# Patient Record
Sex: MALE | Race: WHITE | Employment: FULL TIME | URBAN - METROPOLITAN AREA
[De-identification: names, ages, dates, MRNs, and addresses within clinical notes are randomized per-mention and may not be internally consistent; named-entity substitution may affect disease eponyms.]

---

## 2019-11-18 ENCOUNTER — APPOINTMENT (OUTPATIENT)
Dept: CT IMAGING | Facility: CLINIC | Age: 29
End: 2019-11-18
Attending: EMERGENCY MEDICINE
Payer: MEDICAID

## 2019-11-18 ENCOUNTER — HOSPITAL ENCOUNTER (EMERGENCY)
Facility: CLINIC | Age: 29
Discharge: HOME | End: 2019-11-18
Attending: EMERGENCY MEDICINE | Admitting: EMERGENCY MEDICINE
Payer: MEDICAID

## 2019-11-18 VITALS
RESPIRATION RATE: 16 BRPM | HEART RATE: 74 BPM | OXYGEN SATURATION: 99 % | DIASTOLIC BLOOD PRESSURE: 94 MMHG | TEMPERATURE: 97.8 F | SYSTOLIC BLOOD PRESSURE: 137 MMHG | WEIGHT: 190 LBS

## 2019-11-18 DIAGNOSIS — N23 RENAL COLIC: ICD-10-CM

## 2019-11-18 DIAGNOSIS — N20.0 KIDNEY STONE: ICD-10-CM

## 2019-11-18 LAB
ALBUMIN SERPL-MCNC: 4 G/DL (ref 3.4–5)
ALBUMIN UR-MCNC: 20 MG/DL
ALP SERPL-CCNC: 80 U/L (ref 40–150)
ALT SERPL W P-5'-P-CCNC: 29 U/L (ref 0–70)
ANION GAP SERPL CALCULATED.3IONS-SCNC: 4 MMOL/L (ref 3–14)
APPEARANCE UR: CLEAR
AST SERPL W P-5'-P-CCNC: 20 U/L (ref 0–45)
BASOPHILS # BLD AUTO: 0 10E9/L (ref 0–0.2)
BASOPHILS NFR BLD AUTO: 0.4 %
BILIRUB SERPL-MCNC: 0.5 MG/DL (ref 0.2–1.3)
BILIRUB UR QL STRIP: NEGATIVE
BUN SERPL-MCNC: 18 MG/DL (ref 7–30)
CALCIUM SERPL-MCNC: 8.9 MG/DL (ref 8.5–10.1)
CAOX CRY #/AREA URNS HPF: ABNORMAL /HPF
CHLORIDE SERPL-SCNC: 106 MMOL/L (ref 94–109)
CO2 SERPL-SCNC: 28 MMOL/L (ref 20–32)
COLOR UR AUTO: YELLOW
CREAT SERPL-MCNC: 1.06 MG/DL (ref 0.66–1.25)
DIFFERENTIAL METHOD BLD: NORMAL
EOSINOPHIL # BLD AUTO: 0.1 10E9/L (ref 0–0.7)
EOSINOPHIL NFR BLD AUTO: 1.1 %
ERYTHROCYTE [DISTWIDTH] IN BLOOD BY AUTOMATED COUNT: 12.5 % (ref 10–15)
GFR SERPL CREATININE-BSD FRML MDRD: >90 ML/MIN/{1.73_M2}
GLUCOSE SERPL-MCNC: 94 MG/DL (ref 70–99)
GLUCOSE UR STRIP-MCNC: NEGATIVE MG/DL
HCT VFR BLD AUTO: 43.9 % (ref 40–53)
HGB BLD-MCNC: 14.5 G/DL (ref 13.3–17.7)
HGB UR QL STRIP: ABNORMAL
IMM GRANULOCYTES # BLD: 0 10E9/L (ref 0–0.4)
IMM GRANULOCYTES NFR BLD: 0.2 %
KETONES UR STRIP-MCNC: NEGATIVE MG/DL
LEUKOCYTE ESTERASE UR QL STRIP: NEGATIVE
LYMPHOCYTES # BLD AUTO: 2 10E9/L (ref 0.8–5.3)
LYMPHOCYTES NFR BLD AUTO: 20.8 %
MCH RBC QN AUTO: 28.8 PG (ref 26.5–33)
MCHC RBC AUTO-ENTMCNC: 33 G/DL (ref 31.5–36.5)
MCV RBC AUTO: 87 FL (ref 78–100)
MONOCYTES # BLD AUTO: 0.9 10E9/L (ref 0–1.3)
MONOCYTES NFR BLD AUTO: 9.7 %
MUCOUS THREADS #/AREA URNS LPF: PRESENT /LPF
NEUTROPHILS # BLD AUTO: 6.4 10E9/L (ref 1.6–8.3)
NEUTROPHILS NFR BLD AUTO: 67.8 %
NITRATE UR QL: NEGATIVE
NRBC # BLD AUTO: 0 10*3/UL
NRBC BLD AUTO-RTO: 0 /100
PH UR STRIP: 5 PH (ref 5–7)
PLATELET # BLD AUTO: 196 10E9/L (ref 150–450)
POTASSIUM SERPL-SCNC: 3.6 MMOL/L (ref 3.4–5.3)
PROT SERPL-MCNC: 7.4 G/DL (ref 6.8–8.8)
RBC # BLD AUTO: 5.04 10E12/L (ref 4.4–5.9)
RBC #/AREA URNS AUTO: 159 /HPF (ref 0–2)
SODIUM SERPL-SCNC: 138 MMOL/L (ref 133–144)
SOURCE: ABNORMAL
SP GR UR STRIP: 1.03 (ref 1–1.03)
UROBILINOGEN UR STRIP-MCNC: NORMAL MG/DL (ref 0–2)
WBC # BLD AUTO: 9.4 10E9/L (ref 4–11)
WBC #/AREA URNS AUTO: 2 /HPF (ref 0–5)

## 2019-11-18 PROCEDURE — 99284 EMERGENCY DEPT VISIT MOD MDM: CPT | Mod: 25

## 2019-11-18 PROCEDURE — 74176 CT ABD & PELVIS W/O CONTRAST: CPT

## 2019-11-18 PROCEDURE — 25800030 ZZH RX IP 258 OP 636: Performed by: EMERGENCY MEDICINE

## 2019-11-18 PROCEDURE — 81001 URINALYSIS AUTO W/SCOPE: CPT | Performed by: EMERGENCY MEDICINE

## 2019-11-18 PROCEDURE — 96360 HYDRATION IV INFUSION INIT: CPT

## 2019-11-18 PROCEDURE — 80053 COMPREHEN METABOLIC PANEL: CPT | Performed by: EMERGENCY MEDICINE

## 2019-11-18 PROCEDURE — 85025 COMPLETE CBC W/AUTO DIFF WBC: CPT | Performed by: EMERGENCY MEDICINE

## 2019-11-18 RX ORDER — KETOROLAC TROMETHAMINE 15 MG/ML
15 INJECTION, SOLUTION INTRAMUSCULAR; INTRAVENOUS
Status: DISCONTINUED | OUTPATIENT
Start: 2019-11-18 | End: 2019-11-18 | Stop reason: HOSPADM

## 2019-11-18 RX ORDER — ONDANSETRON 2 MG/ML
4 INJECTION INTRAMUSCULAR; INTRAVENOUS EVERY 30 MIN PRN
Status: DISCONTINUED | OUTPATIENT
Start: 2019-11-18 | End: 2019-11-18 | Stop reason: HOSPADM

## 2019-11-18 RX ORDER — ONDANSETRON 4 MG/1
4 TABLET, ORALLY DISINTEGRATING ORAL EVERY 8 HOURS PRN
Qty: 15 TABLET | Refills: 0 | Status: SHIPPED | OUTPATIENT
Start: 2019-11-18

## 2019-11-18 RX ORDER — IBUPROFEN 600 MG/1
600 TABLET, FILM COATED ORAL EVERY 8 HOURS PRN
Qty: 30 TABLET | Refills: 0 | Status: SHIPPED | OUTPATIENT
Start: 2019-11-18 | End: 2019-12-18

## 2019-11-18 RX ADMIN — SODIUM CHLORIDE 1000 ML: 9 INJECTION, SOLUTION INTRAVENOUS at 08:48

## 2019-11-18 ASSESSMENT — ENCOUNTER SYMPTOMS
FEVER: 0
FREQUENCY: 0
NAUSEA: 1
FLANK PAIN: 1
DIARRHEA: 0
VOMITING: 1
DYSURIA: 0
CONSTIPATION: 0
HEMATURIA: 0
DIFFICULTY URINATING: 0

## 2019-11-18 NOTE — ED AVS SNAPSHOT
[facility] Emergency Department  [address]  [facility] [address]  Phone:  [phone]  Fax:  [phone]                                    Anita Claudio   MRN: [id number]    Department:  [facility] Emergency Department   Date of Visit:  11/18/2019           After Visit Summary Signature Page    I have received my discharge instructions, and my questions have been answered. I have discussed any challenges I see with this plan with the nurse or doctor.    ..........................................................................................................................................  Patient/Patient Representative Signature      ..........................................................................................................................................  Patient Representative Print Name and Relationship to Patient    ..................................................               ................................................  Date                                   Time    ..........................................................................................................................................  Reviewed by Signature/Title    ...................................................              ..............................................  Date                                               Time          22EPIC Rev 08/18

## 2019-11-18 NOTE — ED TRIAGE NOTES
Pt woke up with L groin/testical pain. One episode of vomiting. Currently pain free. No dysuria. ABC intact. A&O x4.

## 2024-01-15 NOTE — ED PROVIDER NOTES
History     Chief Complaint:  Groin Pain    HPI   Anita Claudio is a 29 year old male who presents with left hip pain radiating to his groin. He has had a prior appendectomy. The patient reports at 0600 this morning, he woke up to left sided hip pain which radiated to his groin which was worse with movement. He states he has been nauseated and vomited once. He notes that on his way to the ED, his pain resolved. He denies fevers, urinary symptoms, bowel symptoms, right-sided pain, and swelling or redness in the groin or testicles. He denies urinary symptoms. No trauma or injuries. He has no prior similar episodes of pain. No numbness, tingling, or weakness in his legs. He reports he otherwise feels well.     Allergies:  No Known Drug Allergies     Medications:    The patient is currently on no regular medications.     Past Medical History:    History reviewed. No pertinent past medical history.     Past Surgical History:    Appendectomy      Family History:    Kidney stone (Mother)    Social History:  The patient was accompanied to the ED by mother.  Smoking Status: never  Smokeless Tobacco: Never  Alcohol Use: Yes   Marital Status:  Single      Review of Systems   Constitutional: Negative for fever.   Gastrointestinal: Positive for nausea and vomiting. Negative for constipation and diarrhea.   Genitourinary: Positive for flank pain and testicular pain. Negative for decreased urine volume, difficulty urinating, dysuria, frequency, hematuria, penile pain, penile swelling, scrotal swelling and urgency.   All other systems reviewed and are negative.      Physical Exam     Patient Vitals for the past 24 hrs:   BP Temp Temp src Heart Rate Resp SpO2 Weight   11/18/19 0718 (!) 135/93 97.8  F (36.6  C) Temporal 78 16 98 % 86.2 kg (190 lb)     Physical Exam  General: Well appearing, nontoxic.  Resting comfortably  Head:  Scalp, face, and head appear normal  Eyes:  Pupils are equal, round    Conjunctivae non-injected and    Problem: At Risk for Falls  Goal: Patient takes action to control fall-related risks  Outcome: Monitoring/Evaluating progress     Problem: Impaired Physical Mobility  Goal: Tolerates activity for discharge setting with no abnormal symptoms  Outcome: Monitoring/Evaluating progress      sclerae white  ENT:    The external nose is normal    Pinnae are normal    The oropharynx is normal, mucous membranes moist    Posterior pharynx moderately erythematous.  No exudates or swelling.    Uvula is in the midline  Neck:  Trachea is in the midline  CV:  Regular rate and rhythm     Normal S1/S2, no S3/S4    No murmur or rub  Resp:  Lungs are clear and equal bilaterally    There is no tachypnea    No increased work of breathing    No rales, wheezing, or rhonchi  GI:  Abdomen is soft, no rigidity or guarding    No distension, or mass    No tenderness or rebound tenderness. No CVA tenderness.  No left lower quadrant tenderness.  MS:  Normal muscular tone  Skin:  No rash or acute skin lesions noted  Neuro: Awake and alert    Speech is normal and fluent    Moves all extremities spontaneously  Psych:  Normal affect.  Appropriate interactions.    Emergency Department Course   Imaging:  Radiographic findings were communicated with the patient who voiced understanding of the findings.    CT Abdomen Pelvis w Contrast  Findings suggest a recently passed 4 mm stone in the  dependent bladder as above.  As read by Radiology.     Laboratory:  CBC: WNL (WBC 9.4, HGB 14.5, )  CMP: WNL (Creatinine 1.06)  UA: Large blood, Protein albumin 20, RBC/ (H), Mucous present, Few Calcium oxalate o/w WNL    Procedures:  None    Interventions:  0848: NS 1L IV Bolus      Emergency Department Course:  Past medical records, nursing notes, and vitals reviewed.  0814: I performed an exam of the patient and obtained history, as documented above.  IV inserted and blood drawn.  The patient was sent for an abdomen pelvis CT while in the emergency department, findings above.  The patient provided a urine sample here in the emergency department. This was sent for laboratory testing, findings above.      0922: I rechecked the patient.     1021: I rechecked the patient.  Findings and plan explained to the Patient. Patient discharged  home with instructions regarding supportive care, medications, and reasons to return. The importance of close follow-up was reviewed.      Impression & Plan    Medical Decision Making:  Anita Claudio is a 29 year old male who presents with flank pain. A broad differential diagnosis was considered including diverticulitis, ureterolithiasis, tumor, colitis, cholecystitis, aneurysm, dissection, volvulus, appendicitis, splenic issue, stomach pathology, ulcer, hydronephrosis, pneumonia, rib fracture, UTI, pyelonephritis amongst many other etiologies.   Signs and symptoms consistent with ureterolithiasis.  Patients pain is controlled in ED. CT scan with 4mm stone in the dependent bladder. No other stones in the proximal urinary tract. No obstruction.  No signs of infected stone. Creatinine normal.  Given that stone has already passed into the bladder would not start flomax at this time. Patient is hemodynamically stable in ED. Plan is home with abdominal pain recheck by primary care physician or return to ED if symptoms worsen.  Return for fevers greater than 102, increasing pain, other new symptoms develop.  Ureterolithiasis precautions for home.  Questions were answered. Return precautions were discussed with patient. The patient's questions were answered and the patient was agreeable with discharge.     Diagnosis:    ICD-10-CM    1. Renal colic N23    2. Kidney stone N20.0        Disposition:  discharged to home    Discharge Medications:  New Prescriptions    IBUPROFEN (ADVIL/MOTRIN) 600 MG TABLET    Take 1 tablet (600 mg) by mouth every 8 hours as needed for pain    ONDANSETRON (ZOFRAN ODT) 4 MG ODT TAB    Take 1 tablet (4 mg) by mouth every 8 hours as needed for nausea or vomiting     11/18/2019   [facility] EMERGENCY DEPARTMENT  Brett PURCELL am serving as a scribe at 8:14 AM on 11/18/2019 to document services personally performed by Zachariah Mahoney MD based on my observations and the provider's  statements to me.      Zachariah Mahoney MD  11/18/19 8373

## 2025-03-17 ENCOUNTER — DOCUMENTATION ONLY (OUTPATIENT)
Dept: TRANSPLANT | Facility: CLINIC | Age: 35
End: 2025-03-17
Payer: MEDICAID

## 2025-03-17 ENCOUNTER — TELEPHONE (OUTPATIENT)
Dept: TRANSPLANT | Facility: CLINIC | Age: 35
End: 2025-03-17
Payer: MEDICAID

## 2025-03-17 NOTE — TELEPHONE ENCOUNTER
Donor Intake Start:3/11/2025Donor Intake Complete:3/13/2025  Expiration Date:2025  Gender:MalePreferred Language:English  Full Name:Anita Barrett  Needed:[not answered]  Preferred Name:Anita  Phone Number:[phone] Phone:[not answered]  Contact Preference:[not answered]Best Contact Time:10am - 3pm  Emergency Contact:Amirah Franco Contact #:[phone]  Relationship to Contact:Contact is my parent  :1990Age:34  Country:[place]  Address:[address] [place] [address] [address]City:[place]  State:[id number]  Height:6' 0''Weight:197lbs  BMI:26.7  Employment Status:EmployedHas PTO for donation?Yes, I will use my vacation days.  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 DegreeMarital Status:Single  Exercise Routine:NeverHealth Insurance:Yes  Blood Type:UnknownEthnicity/Race:White  Donor Type:Standard Voucher Donor  Prefer Remote Donation:[not answered]  Physician:[not answered]  Motivation to donate:For my friend angel known almost 20 years  Living Donor Pre-Screening  Is In [place]Yes  Will Accept Blood Transfusions?Yes  Has been Diagnosed with Kidney Disease?No  Has had a Heart Attack?No  Has Diabetes?No  Has had Cancer?No  Has had Kidney Stones?Yes    - number of episodes?0    - last stones passed?0    - last stones treated?0  Has Used TobaccoNo  Has HIV?No  Is Currently Incarcerated?No  Is Currently Residing in [place]Yes  History Misc  Has Allergies?Yes  Allergy  Cats and anise(neither diagnosed)  Has had Surgeries?Yes  Surgery When  appendectomy 2010  Takes Medication?No  Medical History  History of High BP?Never  Has History Of CABG (bypass surgery)?No  History of Blood Clots?Never  History of Coronary Disease?Never  Has Stents Implanted?No  Has History of Chest Pain with Exercise?No  Has History of Chest Pain at Other Times?No  Results of Climbing 2 Flights of Stairs?No Problem  Has had Stress Test within Last Year?No  Has had  Stroke?No  Has had Leg Bypass?No  History of Lung Disease?Never  History of COPD?Never  History of TB?Never    - Is TB Active?[not answered]  History of Pneumonia?Unknown  Has Respiratory Issues?No  Has Gastro Issues?No  History of Gallstones?Never  History of Pancreatitis?Never  History of Liver Disease?Never  History of Hepatitis B?Never    - Is Hep B Active?[not answered]  History of Hepatitis C?Never  History of Bleeding Problem?Never  History of UTIs?No  History of Kidney Damage?Never  History of Proteinuria?Never  History of Hematuria?Never  History of Neuro Disease?Never  History of Seizure?Never  History of Lupus?Never  History of Paralysis?Never  History of Arthritis?Never  History of Neuropathy?Never  History of Depression?Treated in past  History of Anxiety?Never  History of Documented Psychiatric Illness?Never  Has had Transfusions?Unknown  History of Obesity?No  History of Fabry's Disease?No  History of Sickle Cell Disease?No  History of Sickle Cell Trait?No  History of Sarcoidosis?No  History of Auto-Immune DiseaseNo  Has had Physical Exam?Yes    - how many years ago:4  Has had PSA Test?No  Has had Colonoscopy?No  Living Donor Family Medical History  Anyone with kidney disease?No  Anyone with liver disease?No  Anyone with heart disease?No  Anyone with coronary artery disease?No  Anyone with high blood pressure?Yes    - which family members:Mother, and 2nd uncle  Anyone with blood disorder?No  Anyone with cancer?No  Anyone with kidney cancer?No  Anyone with diabetes?Yes    - which family members:Great great aunt mothers side (type 2)  Is mother alive?Yes  Mother's age at time of death?60  Is father alive?Yes  Father's age at time of death?60  How many siblings?4  How many adult children?0  How many children under 18?0  Social History  Has Used Alcohol?Yes    - currently uses alcohol:Yes    - how much:1/Monthly  Has Abused Alcohol?No  Has Used Drugs?No  Has had legal issues w/ law  enforcement?No  Traveled over 100 miles from home in the last year?Yes    - Traveled Where?[place]  Has had suicidal thoughts or attempts in the last five years?No

## 2025-03-19 ENCOUNTER — TELEPHONE (OUTPATIENT)
Dept: TRANSPLANT | Facility: CLINIC | Age: 35
End: 2025-03-19
Payer: MEDICAID

## 2025-03-19 NOTE — TELEPHONE ENCOUNTER
Initial Independent Living Donor Advocate contact made with potential donor today.  I introduced myself and my role during the donation process, including:   CYNTHIA ROLE   The federal government requires that all licensed transplant centers provide the living donor with an Independent Living Donor Advocate (CYNTHIA).  I do not meet recipients or attend meetings that discuss their care or decision to transplant them. My role is separate to avoid any conflict of interest.  My role is to ensure:  1) your rights are protected;  2) you get all the information you need from the transplant team to make a fully informed decision whether to donate;   3) that living donation is in your best interest.   4) that you have the right to decide NOT to go forward with living donation at any time during this process.  I am available to you throughout the workup, during surgery phase and follow-up at home.     WORKUP & PRIVACY   Your identity and workup are not shared with the recipient at any time.   The recipient's insurance covers the medical expenses related to the donor evaluation and surgery.  However, it is important that you carry your own health insurance to address any medical issues that are found and are NOT related to living donation.  Additionally, age appropriate cancer screening (I.e. mammograms,  colonoscopies, etc) is required and would be through your insurance.  There is a psychosocial and medical donor workup that consists of testing to determine if you are healthy enough to donate. Workup tests include tissue typing/genetics, many blood draws, urine collection/ (kidney function testing), chest x-ray, EKG/other heart testing, CT scan. Age appropriate cancer screening is required and would be through your insurance. As you complete each step then you may move on to the next.  Workup can take as little or as long as you need and you can stop the process at any time. Transplant is a treatment option, not a cure. A kidney  from a living kidney donor can last 12-14 years.  Other treatment options are  donation and two types of dialysis.   This is major surgery and your estimated hospital stay is approximately 1-2 nights.  After surgery, there are driving and lifting restrictions - no driving for two weeks and no lifting over ten pounds for 8 weeks.  Donors are routinely off from work for 4 - 6 weeks after surgery, and potentially longer if they have a physical job.     If you anticipate lost wages due to donation, donor wage reimbursement options may be available to you and will be reviewed with you during the evaluation process. Donor Shield and NLDAC explained.  We reviewed the importance of completing follow-up labs and surveys at six months, 1 year and 2 years after donation to monitor kidney health and the impact donation has had on their life post donation.        QUESTIONS    Have you received the Welcome e-mail that includes copies of the informed consent, financial letter, information on donor shield and NLDAC from the transplant department? Yes.    Have you discussed with anyone your potential decision to donate?   Yes.    Is anyone pressuring or coercing you to donate? No.    Have you discussed any financial arrangements with recipient around donating a kidney? No.    Are you aware that you can confidentially opt out at any time, up to and including day of donation? Yes.    At this time, would you like to proceed with the medical evaluation to see if you can be a kidney donor?  Yes.    If yes, I will make an appointment for your donor coordinator to reach out to you with next steps.     Contact information for CYNTHIA's was provided Yes.    Demographic Information:   Preferred Name: Anita   Preferred Pronouns: he/him  Race/Ethnicity: white    Cuca Quick- [phone]  Jess Fishman-  [phone]    Confirmed that he reviewed Informed consent document and all questions answered.  Reviewed that they will receive  Docusign to obtain electronic signature for the following: Informed consent, SRTR data, MUNA for medical information, Auth for Electronic communication, Kidney for Life and will need their signed consent back before proceeding with evaluation.     Time frame for donation: open  Paired exchange was introduced  Yes.      MyChart was initiated  Yes.  CareEverywhere was initiated Yes.    CYNTHIA NOTES: Wants to donate to a friend since high school. Anita lives in [place] with 2 roommates. Works in sales at [employer]. Has health insurance. Has talked to family about it, but mom is skeptical. Roseann Reza will call on 4/1 at 1pm for nurse call.     Jess Fishman, [facility], CCTSW   Independent Living Donor Advocate  [facility], [facility], [facility]  Direct: [phone]  E-Mail: [email or web address]    Duration of call 30 minutes

## 2025-03-27 ENCOUNTER — DOCUMENTATION ONLY (OUTPATIENT)
Dept: TRANSPLANT | Facility: CLINIC | Age: 35
End: 2025-03-27
Payer: MEDICAID

## 2025-04-01 ENCOUNTER — TELEPHONE (OUTPATIENT)
Dept: TRANSPLANT | Facility: CLINIC | Age: 35
End: 2025-04-01
Payer: MEDICAID

## 2025-04-01 DIAGNOSIS — Z00.5 TRANSPLANT DONOR EVALUATION: Primary | ICD-10-CM

## 2025-04-01 RX ORDER — ALBUTEROL SULFATE 0.83 MG/ML
2.5 SOLUTION RESPIRATORY (INHALATION)
OUTPATIENT
Start: 2025-04-24

## 2025-04-01 RX ORDER — METHYLPREDNISOLONE SODIUM SUCCINATE 40 MG/ML
40 INJECTION INTRAMUSCULAR; INTRAVENOUS
Start: 2025-04-24

## 2025-04-01 RX ORDER — DIPHENHYDRAMINE HYDROCHLORIDE 50 MG/ML
25 INJECTION, SOLUTION INTRAMUSCULAR; INTRAVENOUS
Start: 2025-04-24

## 2025-04-01 RX ORDER — DIPHENHYDRAMINE HYDROCHLORIDE 50 MG/ML
50 INJECTION, SOLUTION INTRAMUSCULAR; INTRAVENOUS
Start: 2025-04-24

## 2025-04-01 RX ORDER — MEPERIDINE HYDROCHLORIDE 25 MG/ML
25 INJECTION INTRAMUSCULAR; INTRAVENOUS; SUBCUTANEOUS
OUTPATIENT
Start: 2025-04-24

## 2025-04-01 RX ORDER — ALBUTEROL SULFATE 90 UG/1
1-2 INHALANT RESPIRATORY (INHALATION)
Start: 2025-04-24

## 2025-04-01 RX ORDER — EPINEPHRINE 1 MG/ML
0.3 INJECTION, SOLUTION, CONCENTRATE INTRAVENOUS EVERY 5 MIN PRN
OUTPATIENT
Start: 2025-04-24

## 2025-04-01 NOTE — TELEPHONE ENCOUNTER
Contacted Anita Claudio to introduce myself and my role, review of medical/surgical/family history and next steps.     Anita Claudio  is aware He can stop donor evaluation at any time.    Regular blood donor? No Last blood donation date n/a (Notified donor to avoid blood donation at this time to get accurate blood counts if going through evaluation)     Anita Claudio is a 34 year old male ABO unknown that would like to learn more about kidney donation to a friend. Open to paired exchange: yes, advance donation is preferred     Concerns from medical/surgical/family history:   1 kidney stone-2019 (sent orders to get a litholink completed)    Current medications and NSAID use:   none    Allergies reviewed: yes    Legal issues w/ law enforcement: no    Reviewed any history of travel in endemic areas: [place], no  Strongyloides- [place], [place] and [place].  Trypanosoma cruzi ([place])- [place]  West Nile Virus- [place], [place], [place], [place] and [place]. (Included in TASNEEM testing prior to donation)    Per our Phase 1 algorithm, does not meet criteria to do preliminary testing. Social work screening no.      Verified that potential donor was provided the informed consent DocuSign and they are comfortable moving forward to living donor evaluation.    Reviewed evaluation testing: Iohexol, Lab work, CXR, EKG, Provider visits and functions, CT Angiogram.     Reviewed operations of selection committee and angio review meetings and the need for multidisciplinary input. Post-donation requirements include post-op appointment with your surgeon at 2 weeks after surgery, 6 week, 6 month, 1 year and 2 year lab tests.     Reviewed NKR listing and transfer of care to KPD team if approved. Provided Anita with NKR website to review.     Briefly went over options if approved of NDD and voucher donation.     Anita would like to proceed with next steps: andrey     Encouraged sign up for MyChart and reviewed  importance of watching teaching videos prior to evaluation.    Verified recipient status if not NDD.    Donor timeline: TBD     Will send orders to scheduling team to set up for eval testing.

## 2025-04-12 ENCOUNTER — HEALTH MAINTENANCE LETTER (OUTPATIENT)
Age: 35
End: 2025-04-12

## 2025-04-23 LAB
ABO + RH BLD: NORMAL
BLD GP AB SCN SERPL QL: NEGATIVE
SPECIMEN EXP DATE BLD: NORMAL

## 2025-04-24 ENCOUNTER — LAB (OUTPATIENT)
Dept: LAB | Facility: CLINIC | Age: 35
End: 2025-04-24
Attending: INTERNAL MEDICINE

## 2025-04-24 ENCOUNTER — OFFICE VISIT (OUTPATIENT)
Dept: TRANSPLANT | Facility: CLINIC | Age: 35
End: 2025-04-24
Attending: INTERNAL MEDICINE

## 2025-04-24 ENCOUNTER — APPOINTMENT (OUTPATIENT)
Dept: TRANSPLANT | Facility: CLINIC | Age: 35
End: 2025-04-24
Attending: INTERNAL MEDICINE

## 2025-04-24 ENCOUNTER — ALLIED HEALTH/NURSE VISIT (OUTPATIENT)
Dept: TRANSPLANT | Facility: CLINIC | Age: 35
End: 2025-04-24
Attending: INTERNAL MEDICINE

## 2025-04-24 ENCOUNTER — LAB (OUTPATIENT)
Dept: LAB | Facility: CLINIC | Age: 35
End: 2025-04-24

## 2025-04-24 ENCOUNTER — OFFICE VISIT (OUTPATIENT)
Dept: INFUSION THERAPY | Facility: CLINIC | Age: 35
End: 2025-04-24
Attending: INTERNAL MEDICINE

## 2025-04-24 VITALS
HEART RATE: 58 BPM | WEIGHT: 215.2 LBS | OXYGEN SATURATION: 99 % | SYSTOLIC BLOOD PRESSURE: 109 MMHG | TEMPERATURE: 97.9 F | RESPIRATION RATE: 16 BRPM | DIASTOLIC BLOOD PRESSURE: 69 MMHG

## 2025-04-24 VITALS
HEIGHT: 73 IN | SYSTOLIC BLOOD PRESSURE: 111 MMHG | WEIGHT: 215.17 LBS | BODY MASS INDEX: 28.52 KG/M2 | DIASTOLIC BLOOD PRESSURE: 73 MMHG | HEART RATE: 63 BPM | OXYGEN SATURATION: 99 %

## 2025-04-24 DIAGNOSIS — Z00.5 TRANSPLANT DONOR EVALUATION: Primary | ICD-10-CM

## 2025-04-24 DIAGNOSIS — Z00.5 TRANSPLANT DONOR EVALUATION: ICD-10-CM

## 2025-04-24 DIAGNOSIS — Z00.5 ENCOUNTER FOR EVALUATION TO BE TRANSPLANT DONOR: Primary | ICD-10-CM

## 2025-04-24 LAB
ABO + RH BLD: NORMAL
ALBUMIN MFR UR ELPH: 8.8 MG/DL
ALBUMIN SERPL BCG-MCNC: 4.4 G/DL (ref 3.5–5.2)
ALBUMIN UR-MCNC: NEGATIVE MG/DL
ALP SERPL-CCNC: 75 U/L (ref 40–150)
ALT SERPL W P-5'-P-CCNC: 18 U/L (ref 0–70)
ANION GAP SERPL CALCULATED.3IONS-SCNC: 7 MMOL/L (ref 7–15)
APPEARANCE UR: CLEAR
APTT PPP: 34 SECONDS (ref 22–38)
AST SERPL W P-5'-P-CCNC: 23 U/L (ref 0–45)
BILIRUB SERPL-MCNC: 0.8 MG/DL
BILIRUB UR QL STRIP: NEGATIVE
BUN SERPL-MCNC: 17 MG/DL (ref 6–20)
CALCIUM SERPL-MCNC: 9.6 MG/DL (ref 8.8–10.4)
CHLORIDE SERPL-SCNC: 105 MMOL/L (ref 98–107)
CHOLEST SERPL-MCNC: 166 MG/DL
CMV IGG SERPL IA-ACNC: <0.2 U/ML
CMV IGG SERPL IA-ACNC: NORMAL
COLOR UR AUTO: ABNORMAL
CREAT SERPL-MCNC: 1.08 MG/DL (ref 0.67–1.17)
CREAT UR-MCNC: 123 MG/DL
CREAT UR-MCNC: 127 MG/DL
CYSTATIN C (ROCHE): 1 MG/L (ref 0.6–1)
EBV VCA IGG SER IA-ACNC: >750 U/ML
EBV VCA IGG SER IA-ACNC: POSITIVE
EGFRCR SERPLBLD CKD-EPI 2021: >90 ML/MIN/1.73M2
ERYTHROCYTE [DISTWIDTH] IN BLOOD BY AUTOMATED COUNT: 12.7 % (ref 10–15)
EST. AVERAGE GLUCOSE BLD GHB EST-MCNC: 108 MG/DL
FASTING STATUS PATIENT QL REPORTED: YES
FASTING STATUS PATIENT QL REPORTED: YES
GFR/BSA.PRED SERPLBLD CYS-BASED-ARV: 87 ML/MIN/1.73M2
GLUCOSE SERPL-MCNC: 93 MG/DL (ref 70–99)
GLUCOSE UR STRIP-MCNC: NEGATIVE MG/DL
HBA1C MFR BLD: 5.4 %
HBV CORE AB SERPL QL IA: NONREACTIVE
HBV SURFACE AB SERPL IA-ACNC: 141 M[IU]/ML
HBV SURFACE AB SERPL IA-ACNC: REACTIVE M[IU]/ML
HBV SURFACE AG SERPL QL IA: NONREACTIVE
HCO3 SERPL-SCNC: 27 MMOL/L (ref 22–29)
HCT VFR BLD AUTO: 45.3 % (ref 40–53)
HCV AB SERPL QL IA: NONREACTIVE
HDLC SERPL-MCNC: 48 MG/DL
HGB BLD-MCNC: 15.3 G/DL (ref 13.3–17.7)
HGB UR QL STRIP: NEGATIVE
HYALINE CASTS: 1 /LPF
INR PPP: 1 (ref 0.85–1.15)
KETONES UR STRIP-MCNC: NEGATIVE MG/DL
LDLC SERPL CALC-MCNC: 91 MG/DL
LEUKOCYTE ESTERASE UR QL STRIP: NEGATIVE
MCH RBC QN AUTO: 28.4 PG (ref 26.5–33)
MCHC RBC AUTO-ENTMCNC: 33.8 G/DL (ref 31.5–36.5)
MCV RBC AUTO: 84 FL (ref 78–100)
MICROALBUMIN UR-MCNC: 14.5 MG/L
MICROALBUMIN/CREAT UR: 11.79 MG/G CR (ref 0–17)
MUCOUS THREADS #/AREA URNS LPF: PRESENT /LPF
NITRATE UR QL: NEGATIVE
NONHDLC SERPL-MCNC: 118 MG/DL
PH UR STRIP: 5.5 [PH] (ref 5–7)
PHOSPHATE SERPL-MCNC: 3 MG/DL (ref 2.5–4.5)
PLATELET # BLD AUTO: 196 10E3/UL (ref 150–450)
POTASSIUM SERPL-SCNC: 3.9 MMOL/L (ref 3.4–5.3)
PROT SERPL-MCNC: 7.4 G/DL (ref 6.4–8.3)
PROT/CREAT 24H UR: 0.07 MG/MG CR (ref 0–0.2)
RBC # BLD AUTO: 5.38 10E6/UL (ref 4.4–5.9)
RBC URINE: <1 /HPF
SODIUM SERPL-SCNC: 139 MMOL/L (ref 135–145)
SP GR UR STRIP: 1.02 (ref 1–1.03)
SPECIMEN EXP DATE BLD: NORMAL
T PALLIDUM AB SER QL: NONREACTIVE
TRIGL SERPL-MCNC: 134 MG/DL
URATE SERPL-MCNC: 5.5 MG/DL (ref 3.4–7)
UROBILINOGEN UR STRIP-MCNC: NORMAL MG/DL
WBC # BLD AUTO: 6.3 10E3/UL (ref 4–11)
WBC URINE: 3 /HPF

## 2025-04-24 PROCEDURE — 36415 COLL VENOUS BLD VENIPUNCTURE: CPT

## 2025-04-24 PROCEDURE — 86789 WEST NILE VIRUS ANTIBODY: CPT

## 2025-04-24 PROCEDURE — 86803 HEPATITIS C AB TEST: CPT

## 2025-04-24 PROCEDURE — 86665 EPSTEIN-BARR CAPSID VCA: CPT

## 2025-04-24 PROCEDURE — 81001 URINALYSIS AUTO W/SCOPE: CPT

## 2025-04-24 PROCEDURE — 84100 ASSAY OF PHOSPHORUS: CPT

## 2025-04-24 PROCEDURE — 86704 HEP B CORE ANTIBODY TOTAL: CPT

## 2025-04-24 PROCEDURE — 36415 COLL VENOUS BLD VENIPUNCTURE: CPT | Performed by: SURGERY

## 2025-04-24 PROCEDURE — 80051 ELECTROLYTE PANEL: CPT

## 2025-04-24 PROCEDURE — 86900 BLOOD TYPING SEROLOGIC ABO: CPT

## 2025-04-24 PROCEDURE — 83036 HEMOGLOBIN GLYCOSYLATED A1C: CPT

## 2025-04-24 PROCEDURE — 85610 PROTHROMBIN TIME: CPT

## 2025-04-24 PROCEDURE — 82570 ASSAY OF URINE CREATININE: CPT

## 2025-04-24 PROCEDURE — 255N000002 HC RX 255 OP 636: Performed by: INTERNAL MEDICINE

## 2025-04-24 PROCEDURE — 84156 ASSAY OF PROTEIN URINE: CPT

## 2025-04-24 PROCEDURE — 82043 UR ALBUMIN QUANTITATIVE: CPT

## 2025-04-24 PROCEDURE — 80061 LIPID PANEL: CPT

## 2025-04-24 PROCEDURE — 82310 ASSAY OF CALCIUM: CPT

## 2025-04-24 PROCEDURE — 85730 THROMBOPLASTIN TIME PARTIAL: CPT

## 2025-04-24 PROCEDURE — 86850 RBC ANTIBODY SCREEN: CPT

## 2025-04-24 PROCEDURE — 87340 HEPATITIS B SURFACE AG IA: CPT

## 2025-04-24 PROCEDURE — 86780 TREPONEMA PALLIDUM: CPT

## 2025-04-24 PROCEDURE — 84550 ASSAY OF BLOOD/URIC ACID: CPT

## 2025-04-24 PROCEDURE — 86644 CMV ANTIBODY: CPT

## 2025-04-24 PROCEDURE — 85014 HEMATOCRIT: CPT

## 2025-04-24 PROCEDURE — 86706 HEP B SURFACE ANTIBODY: CPT

## 2025-04-24 PROCEDURE — 82610 CYSTATIN C: CPT

## 2025-04-24 PROCEDURE — 81379 HLA I TYPING COMPLETE HR: CPT

## 2025-04-24 RX ORDER — ALBUTEROL SULFATE 0.83 MG/ML
2.5 SOLUTION RESPIRATORY (INHALATION)
Status: CANCELLED | OUTPATIENT
Start: 2025-04-24

## 2025-04-24 RX ORDER — EPINEPHRINE 1 MG/ML
0.3 INJECTION, SOLUTION INTRAMUSCULAR; SUBCUTANEOUS EVERY 5 MIN PRN
Status: CANCELLED | OUTPATIENT
Start: 2025-04-24

## 2025-04-24 RX ORDER — DIPHENHYDRAMINE HYDROCHLORIDE 50 MG/ML
25 INJECTION, SOLUTION INTRAMUSCULAR; INTRAVENOUS
Status: CANCELLED
Start: 2025-04-24

## 2025-04-24 RX ORDER — METHYLPREDNISOLONE SODIUM SUCCINATE 40 MG/ML
40 INJECTION INTRAMUSCULAR; INTRAVENOUS
Status: CANCELLED
Start: 2025-04-24

## 2025-04-24 RX ORDER — DIPHENHYDRAMINE HYDROCHLORIDE 50 MG/ML
50 INJECTION, SOLUTION INTRAMUSCULAR; INTRAVENOUS
Status: CANCELLED
Start: 2025-04-24

## 2025-04-24 RX ORDER — ALBUTEROL SULFATE 90 UG/1
1-2 INHALANT RESPIRATORY (INHALATION)
Status: CANCELLED
Start: 2025-04-24

## 2025-04-24 RX ORDER — MEPERIDINE HYDROCHLORIDE 25 MG/ML
25 INJECTION INTRAMUSCULAR; INTRAVENOUS; SUBCUTANEOUS
Status: CANCELLED | OUTPATIENT
Start: 2025-04-24

## 2025-04-24 RX ADMIN — IOHEXOL 4 ML: 350 INJECTION, SOLUTION INTRAVENOUS at 08:45

## 2025-04-24 ASSESSMENT — PAIN SCALES - GENERAL: PAINLEVEL_OUTOF10: NO PAIN (0)

## 2025-04-24 NOTE — LETTER
2025      Anita Claudio  [address]  [place] [address]      Dear Colleague,    Thank you for referring your patient, Anita Claudio, to the [facility]. Please see a copy of my visit note below.    Donor Iohexol test    Anita Claudio presents today to [facility] for a Donor Iohexol test.      Progress note:  ID verified by name and .     The following information was verified with the patient:  Female Patients is there any possibility of being pregnant N/a  Is there a history of allergy (skin rash, swelling, ect) to:   A.  Iodine (except skin reactions to betadine): No   B. Intravenous radio-contrast agents: No   C. Seafood No     present during visit today: Not Applicable.    R.N. provided patient with educational handout regarding timed test. Yes    24 g piv  placed in left hand and Iohexol administered over 2 minutes.  Positive blood return verified before and after injection. piv removed.  20 gauge PIV placed in right AC by lab  for blood draws and CT this afternoon.    Medication administered:  Iohexol (Omnipaque 350mg iodine/ml concentration) 4mls.    Start time: 0845  Stop time: 0847      Administrations This Visit       iohexol (OMNIPAQUE) 350 MG/ML injectable solution 4 mL       Admin Date  2025 Action  $Given Dose  4 mL Route  Intravenous Documented By  Mary Nicole RN              saline flush for lab use ONLY       Admin Date  2025 Action  $Given Dose  10 mL Route  Intracatheter Documented By  Zelda Kilgore RN              sodium chloride (PF) 0.9% PF flush 5 mL       Admin Date  2025 Action  $Given Dose  5 mL Route  Intracatheter Documented By  Mary Nicole RN                        Evaluation nurse in transplant to draw labs at 2 and 4 hours post iohexol administration.  Patient given a slip with the times to get labs drawn and verbalized understanding of the plan.    Patient tolerated the procedure:   Yes    After the infusion patient was discharged to the next appointment.    Mary Nicole RN          Again, thank you for allowing me to participate in the care of your patient.        Sincerely,        Specialty Infusion Nurse    Electronically signed

## 2025-04-24 NOTE — PROGRESS NOTES
Living Kidney Donor Consent per OPTN Policy 14.2 for Independent Living Donor Advocate (CYNTHIA)    Organ Type: living kidney donor  Presenting Information:  He presents to [facility] to complete a living donor evaluation since he is interested in becoming a living kidney donor for his friend.      Written assurance has been obtained from the potential donor that he/she:   Is willing to donate  Is free from inducement and coercion  Has been informed that the he/she may decline to donate at any time  Has been informed that transplant centers must:   A) Offer donors an opportunity to discontinue the donor consent or evaluation process in a way that is protected and confidential  B) Provide an independent living donor advocate (CYNTHIA) to assist the potential donor during this process    The following was presented to the potential donor in a language in which the potential donor is able to engage in meaningful dialogue:   Education and instruction about all phases of the living donation process including:   Consent  Medical and psychosocial evaluation  Information about the surgical procedure  Pre and post operative care  Benefits of post operative follow up  Disclosure that the recovery hospital will take all reasonable precautions to provide confidentiality for the donor/recipient  Disclosure that it is a federal crime for any person to knowingly acquire, obtain or otherwise transfer any human organ for valuable consideration  Disclosure that the [facility] hospital must provide an independent living donor advocate (CYNTHIA)  Disclosure that health information obtained during the evaluation is subject to the same regulations as all records and could reveal conditions that must be reported to local, state, or federal public health authorities  Disclosure that the [facility] hospital is required to report living donor follow up information at 6 months,  1 year, and 2 years, and that the potential donor must commit to post operative follow up testing coordinated by the [facility]    Disclosure has been provided that these risks may be transient or permanent & include but are not limited to:  Potential psychosocial risks:  Problems with body image  Post-surgery depression or anxiety  Feelings of emotional distress or bereavement if recipient experiences any recurrent disease or in the event of the recipient s death  Impact of donation on the donor s lifestyle, such as limited ability to exercise in the short term post operative recovery period, no driving for the first 2 weeks post op or until the donor is no longer needing pain medications that impair the ability to drive.      Potential financial impacts:  Personal expenses of travel, housing, , lost wages related to donation might not be reimbursed. However, resources may be available to defray some donation-related expenses.   Need for life-long follow up at the donor s expense  Loss of employment or income  Negative impact on the ability to obtain future employment  Negative impact on the ability to obtain, maintain, or afford health, disability, and life insurance  Future health problems experienced by living donors following donation may not be covered by the recipient s insurance      PREPARATION FOR DONATION, RECOVERY, AND POTENTIAL SHORT-LONG-TERM OUTCOMES:  Understanding of the Living Donation Process:  We discussed the role of Independent Living Donor Advocate.  Short and long term medical and psychosocial risks to both, donor and recipient were reviewed and he expressed understanding.  Post surgical restrictions (2 weeks no driving, 6 weeks no lifting over 10 lbs) were reviewed and he appears capable of adhering to the post surgical requirements. The need for a caregiver was discussed and parents can help.  The risk of poor psychosocial outcome including problems with body image,  post-surgery depression or anxiety, or feelings of emotional distress or bereavement if recipient experiences any recurrent disease, poor outcome or death was reviewed.  Additionally, potential financial implications, including the risk of having difficulty obtaining health care insurance, life insurance, disability insurance, or long term care insurance were reviewed, as were available donor grants to assist with donor related expenses.      IMPRESSIONS/RECOMMENDATIONS:  Anita appears highly motivated to donate a kidney to a friend. Anita appears capable of understanding this information and making an informed medical decision.  As CYNTHIA, no concerns were identified today. He has my contact information and is aware that I am available thoughout the donation process.    Contact Information:  LLUVIA Maria, [facility]   Independent Living Donor Advocate  [facility], [facility], [facility]  Direct: [phone]  E-Mail: [email or web address]    Time Spent: 20 minutes

## 2025-04-24 NOTE — PROGRESS NOTES
[facility] SOLID ORGAN TRANSPLANT  OUTPATIENT MNT: KIDNEY DONOR EVALUATION     Current BMI: 28.3 (HT 73 in,  lbs/98 kg)    8 Year Estimated Risk of T2DM  </= 3%     TIME SPENT: 15 minutes  VISIT TYPE: Initial  REFERRING PHYSICIAN: William   PT ACCOMPANIED BY: self     NUTRITION ASSESSMENT  - H/o kidney stones: 1 instance 5 years ago, reports he used to work as a  in a 120 degree environment and likely dehydration was at play  - Parental h/o DM: no    Vitamins, Supplements, Pertinent Meds: occasional vit D   Herbal Medicines/Supplements: none   Protein Supplements: none     Weight hx: overall stable weight   Current or past h/o weight loss medications (wegovy, ozempic, phentermine, etc): N    PHYSICAL ACTIVITY   On feet at work- 15k steps/day- works in [employer]  No routine activity outside of work     FOOD SECURITY: any concerns about having enough money to buy food or access to grocery stores? Not asked     DIET RECALL  Veggies- mostly veggies on the weekends (via stir hicks)  Fruit- 1/day (orange most days this week)  Breakfast Pizza    Lunch Rice, chicken, tomato, lettuce, cheese bowl   Dinner Leftover pizza    Snacks Peanuts, candy    Beverages Water (60 oz/day), coffee w/ oat milk + 3 scoops sugar    Alcohol Normally very infrequent, last drank roughly 3 months ago with exception of 2 drinks last week   Dining out 5 days/week      LABS  Recent Labs   Lab Test 04/24/25  0821   CHOL 166   HDL 48   LDL 91   TRIG 134       FBG = 93  A1c = 5.4  BP = wnl x 3     Prediction of Incident Diabetes Mellitus in Middle-aged Adults: The [place] Offspring Study  Martinez Capone MD; James B. Meigs, MD, MPH; Marina Lantigua, PhD; Mariza Hamm MD, MPH; Cory De La Paz MD; Genaro Heaton Sr,   PhD  Pt's estimated risk for T2DM (per Table 6 above)  Pt received points for the following criteria: BMI>25  Total points: 2  8-Year estimated risk of T2DM: </= 3%    NUTRITION DIAGNOSIS  No  "nutrition diagnosis identified at this time.    NUTRITION INTERVENTION  Nutrition education provided:  Reviewed overall healthy diet guidelines for pre and post kidney donation. Discussed that there are no specific \"kidney donor\" diets. Encouraged routine medical follow up post donation (ie annual physicals) to monitor BP and BG levels. If these things change/worsen in the future post donation, we reviewed how diet and lifestyle modifications can help mitigate risk factors for DM & HTN. Discussed maintenance of a healthy weight and Na+ intake <3000 mg/day (<2000 mg/day if HTN). Encouraged adequate hydration >60 oz/day of water post donation.     Avoid the following post op d/t unknown effects on the organs:  - Herbal, Chinese, holistic, chiropractic, natural, alternative medicines and supplements  - Detoxes and cleanses  - Weight loss pills  - Protein powders or other products with extracts or herbs (ie green tea extract)    Patient Understanding: Pt verbalized understanding of education provided.  Expected Engagement: Good  Follow-Up Plans: PRN     NUTRITION GOALS  No nutrition goals identified at this time     Lennie Wilson, RD, LD, CCTD                                "

## 2025-04-24 NOTE — PROGRESS NOTES
Donor Iohexol test    Anita Claudio presents today to [facility] for a Donor Iohexol test.      Progress note:  ID verified by name and .     The following information was verified with the patient:  Female Patients is there any possibility of being pregnant N/a  Is there a history of allergy (skin rash, swelling, ect) to:   A.  Iodine (except skin reactions to betadine): No   B. Intravenous radio-contrast agents: No   C. Seafood No     present during visit today: Not Applicable.    R.N. provided patient with educational handout regarding timed test. Yes    24 g piv  placed in left hand and Iohexol administered over 2 minutes.  Positive blood return verified before and after injection. piv removed.  20 gauge PIV placed in right AC by lab  for blood draws and CT this afternoon.    Medication administered:  Iohexol (Omnipaque 350mg iodine/ml concentration) 4mls.    Start time: 845  Stop time: 847      Administrations This Visit       iohexol (OMNIPAQUE) 350 MG/ML injectable solution 4 mL       Admin Date  2025 Action  $Given Dose  4 mL Route  Intravenous Documented By  Mary Nicole RN              saline flush for lab use ONLY       Admin Date  2025 Action  $Given Dose  10 mL Route  Intracatheter Documented By  Zelda Kilgore RN              sodium chloride (PF) 0.9% PF flush 5 mL       Admin Date  2025 Action  $Given Dose  5 mL Route  Intracatheter Documented By  Mary Nicole RN                        Evaluation nurse in transplant to draw labs at 2 and 4 hours post iohexol administration.  Patient given a slip with the times to get labs drawn and verbalized understanding of the plan.    Patient tolerated the procedure:  Yes    After the infusion patient was discharged to the next appointment.    Mary Nicole RN

## 2025-04-24 NOTE — NURSING NOTE
Chief Complaint   Patient presents with    Blood Draw     Labs obtained via 20 gauge PIV, saline flushed, VS taken, checked into next appt     Unable to void in lab- UA specimen collection cup given to patient. Patient became lightheaded in lab; patient laid back and cool cloth applied to forehead. Patient began feeling better after about 10 minutes of laying flat; BP WNL. Patient declined wheelchair; stable upon leaving lab. Care team updated.

## 2025-04-24 NOTE — PROGRESS NOTES
Saw Anita in clinic on 4/24/2025 for Living Kidney Donor Evaluation.     Anita is interested in donation for his friend Gildardo.    I provided a folder which included copies of the following:    Living Kidney Donor Evaluation Consent  Paired Exchange Consent  Donor Shield Pamphlet  Living Donor Collective Study information  Kidney for Life pamphlet  Kidney Donors are Heroes! Study synopsis  Most current SRTR data.      I also provided a parking pass and food voucher.    I referenced the Living Kidney Donor Evaluation Consent and Paired Exchange/ NDD consent.  I answered any questions.    Evaluation Notes:  Tissue typing collected and sent  Did not know blood type prior to today, ABO B, recipient ABO O. Open to paired exchange/voucher donation.

## 2025-04-24 NOTE — PROGRESS NOTES
TRANSPLANT NEPHROLOGY DONOR EVALUATION    Assessment and Plan:  # Prospective Kidney Transplant Donor: Patient with {Ump Tx Neph Some:96073851} to be addressed prior to donation. Patient's blood pressure is {UMP TX Blood Pressure:434923222} at this visit, kidney function appears to be {UMP TX K FUNCTION:722629913} with Iohexol pending, and urinalysis is {UMP TX URINALYSIS:795504827}.    # Kidney Stone: 4mm stone passed 2019, review roshan ***, stone analysis not done, imaging pending today    Discussed the risks of donating a kidney, including the surgical risk and the possible risks of living with one kidney.    Education about expected post-donation kidney function and how chronic kidney disease (CKD) and end stage kidney disease (ESKD) might potentially impact the donor in the future, include, but not limited to:       - On average, donors will have 25-35% permanent loss of kidney function at donation.       - Baseline risk of ESKD may slightly exceed that of members of the general        population with the same demographic profile.       - Donor risks must be interpreted in light of known epidemiology of both CKD or         ESKD, such as that CKD generally develops in midlife (40-50 years old) and ESKD         generally develops after age 60.       - Medical evaluation of young potential donors cannot predict lifetime risk of CKD or         ESKD.       - Donors may be at higher risk for CKD if they sustain damage to the remaining         kidney.       - Development of CKD and progression of ESKD may be more rapid with only 1         kidney.       - Some type of kidney replacement therapy, either kidney transplant or dialysis, is         required when reaching ESKD.    Potential medical or surgical risks include, but not limited to:       - Death.       - Scars, pain, fatigue, and other consequences typical of any surgical procedure.       - Decreased kidney function.       - Abdominal or bowel symptoms, such  as bloating and nausea, and developing         bowel obstruction.       - Kidney failure (ESKD) and the need for a kidney transplant or dialysis for the donor.       - Impact of obesity, hypertension, or other donor-specific medical conditions on         morbidity and mortality of the potential donor.    Patients overall evaluation will be discussed with the transplant team and a final recommendation on the patients' suitability to be a kidney transplant donor will be made at that time.    Consult:  Anita Claudio was seen in consultation at the request of Dr. Turner Thomas for evaluation as a potential kidney transplant donor.    Reason for Visit:  Anita Claudio is a 34 year old male who presents for a kidney donor evaluation.  Patient would like to donate to Gildardo Johnson, a friend for 20 years, not yet on dialysis .    Present Condition and Donor-Related Medical History:    ***         Kidney Disease Hx:       h/o Kidney Problems: No   Family h/o Genetic Kidney Disease: No       h/o Hypertension: No      Usual Blood Pressure: {UMP TX NEPH RECIP USUAL BP:48799054}       h/o Protein in Urine: No    h/o Blood in Urine: Yes when he had passed kidney stone       h/o Kidney Stones: No     h/o Kidney Injury: No       h/o Recurrent UTI: No   h/o Genitourinary Problems: No       h/o Chronic NSAID Use: Yes occasional          Other Medical Hx:       h/o Diabetes: No             h/o Gastrointestinal, Pancreas or Liver Problems: No       h/o Lung or Heart Problems: No       h/o Hematologic Problems: No  h/o Bleeding or Clotting Problems: No       h/o Cancer: No       h/o Infection Problems: No         Skin Cancer Risk:       h/o more than 50 moles: No       h/o extensive sun exposure: No       h/o melanoma: No       Family h/o melanoma: No         Mental Health Assessment:       h/o Depression: Yes: at age 15, treated for 8 months with antidepressants and again at age 18       h/o Psychiatric Illness: No       h/o Suicidal  Attempt(s): No    Review Of Systems:   A comprehensive review of systems was obtained and negative, except as noted in the HPI or PMH.    Past Medical History:   History was taken from the patient as noted below.  Kidney stone    Past Social History:   Past Surgical History:   Procedure Laterality Date    APPENDECTOMY  2010     Personal or family history of anesthesia problems: No    Family History:          Specific Family History in First Degree Relatives:       FH of Kidney Dz: No  FH of Diabetes: No       FH of Hypertension: Yes mother  FH of CAD: No       FH of Cancer: No  FH of Kidney Cancer: No    Personal History:   Social History     Socioeconomic History    Marital status: Single     Spouse name: Not on file    Number of children: Not on file    Years of education: Not on file    Highest education level: Not on file   Occupational History    Not on file   Tobacco Use    Smoking status: Not on file    Smokeless tobacco: Not on file   Substance and Sexual Activity    Alcohol use: Not on file    Drug use: Not on file    Sexual activity: Not on file   Other Topics Concern    Not on file   Social History Narrative    Not on file     Social Drivers of Health     Financial Resource Strain: Not on file   Food Insecurity: Not on file   Transportation Needs: Not on file   Physical Activity: Not on file   Stress: Not on file   Social Connections: Not on file   Interpersonal Safety: Not on file   Housing Stability: Not on file            Specific Social History:       Health Insurance Status: Yes       Employment Status: Full time  Occupation:                        Living Arrangements: lives with 2 room mates,        Social Support: Yes parents       Presence of increased risk for disease transmission behaviors as defined by PHS guidelines: No        Allergies:  No Known Allergies    Medications:  Current Outpatient Medications   Medication Sig Dispense Refill    erythromycin  (ROMYCIN) ophthalmic ointment Place 0.5 inches Into the left eye 3 times daily 1 Tube 0    ondansetron (ZOFRAN ODT) 4 MG ODT tab Take 1 tablet (4 mg) by mouth every 8 hours as needed for nausea or vomiting 15 tablet 0     No current facility-administered medications for this visit.     There are no discontinued medications.      Vitals:      11/18/2019     9:20 AM 11/18/2019     9:25 AM 4/24/2025     8:26 AM   Vital Signs   Systolic   109   Diastolic   69   Pulse   58   Temperature   97.9  F (36.6  C)   Respirations   16   Weight (LB)   215 lb 3.2 oz   Pain Score   0 (None)   O2 99 % 99 % 99 %       Exam:   GENERAL APPEARANCE: alert and no distress  HENT: mouth without ulcers or lesions  RESP: lungs clear to auscultation - no rales, rhonchi or wheezes  CV: regular rhythm, normal rate, no rub, no murmur  EDEMA: no LE edema bilaterally  ABDOMEN: soft, nondistended, nontender, bowel sounds normal  MS: extremities normal - no gross deformities noted, no evidence of inflammation in joints, no muscle tenderness  SKIN: no rash    Results:   Labs and imaging were ordered for this visit and reviewed by me.  Recent Results (from the past 2 weeks)   CBC with platelets    Collection Time: 04/24/25  8:21 AM   Result Value Ref Range    WBC Count 6.3 4.0 - 11.0 10e3/uL    RBC Count 5.38 4.40 - 5.90 10e6/uL    Hemoglobin 15.3 13.3 - 17.7 g/dL    Hematocrit 45.3 40.0 - 53.0 %    MCV 84 78 - 100 fL    MCH 28.4 26.5 - 33.0 pg    MCHC 33.8 31.5 - 36.5 g/dL    RDW 12.7 10.0 - 15.0 %    Platelet Count 196 150 - 450 10e3/uL   Partial thromboplastin time    Collection Time: 04/24/25  8:21 AM   Result Value Ref Range    aPTT 34 22 - 38 Seconds   INR    Collection Time: 04/24/25  8:21 AM   Result Value Ref Range    INR 1.00 0.85 - 1.15

## 2025-04-24 NOTE — NURSING NOTE
"Chief Complaint   Patient presents with    Transplant Donor Post Donation     Kidney donor eval       BP 1: 109/74  BP 2: 117/74  BP 3: 109/71    /73   Pulse 63   Ht 1.854 m (6' 1\")   Wt 97.6 kg (215 lb 2.7 oz)   SpO2 99%   BMI 28.39 kg/m      Guilherme Trinh RN on 4/24/2025 at 9:36 AM    "

## 2025-04-24 NOTE — PROGRESS NOTES
Transplant Surgery Consult Note    Medical record number: [id number]  YOB: 1990,   Consult requested by the patient for evaluation of kidney donation candidacy.    Assessment and Recommendations: Mr. Claudio appears to be a good candidate for kidney donation at this point in the evaluation. 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 Is hoping to return to work sooner than 6 weeks from surgery. Will need to discuss with social work and his employer to figure out what his return to work capabilities and plan will be to reduce hernia risk.     Iohexol ordered for today for assessment of adequate kidney function for donation. Will be reviewed when resulted  Donor labs ordered for today and reviewed to include: CBC, CMP, Mg, PO4, hemoglobin A1c, lipid panel, blood type x 2, hemoglobin A1c, UA with microscopic, urine culture, urine protein/cr, INR/PTT, Quantiferon gold, hepatitis C (antibody), hepatitis B panel, HIV, treponemia, West Nile (antibody panel), CMV (antibody panel), EBV (antibody panel), pregnancy screen (for females of childbearing age), PSA (males >50yrs), HLA tissue typing, buccal swab for eplet typing  Social work consult ordered  Dietician consult ordered  Transplant nephrology consult ordered  Transplant coordinator consult ordered  CYNTHIA (independent living donor advovate) consult ordered  EKG ordered for today and will be reviewed when resulted. Suitable to proceed today with this testing today:  Yes   Chest x-ray ordered for today and will be reviewed when resulted. Suitable to proceed with this testing today:  Yes   CT angio of abdomen and pelvis for anatomical assessment. Images and report to be reviewed when resulted.  Suitable to proceed with this testing today:  Yes- but felt unwell at end of interview and wants to reschedule this  After review of the above, additional testing/concerns  include:    none    The majority of our visit today was spent in counseling regarding the medical and surgical risks of kidney donation, the typical placido-and post-operative experience and recovery/return to work pattern.  Surgical risks may be transient or permanent and can include but not limited to decreased kidney function or acute kidney failure and the need for dialysis or kidney transplant for the living donor in the immediate post-operative period. We also talked about post-op visits and longer term health care maintenance, as well as the implications of having one remaining kidney. This discussion included, but was not limited to rates of complications such as bleeding, infection, need for transfusion, reoperation, other organ injury, future bowel obstruction, incisional hernia, port site pain, varicocele, venous thrombosis, pulmonary embolism, renal failure, and death (3 per 10,000). The patient understands that if end stage renal failure happens that dialysis or transplant would be required. For female donor of child bearing age, the risks of preeclampsi or gestational hypertension during pregnancies after donation were discussed .  At the conclusion of the visit, all questions had been answered and Mr. Claudio's candidacy for donation will be reviewed at our Multidisciplinary Donor Selection Committee. He will stay in contact with the nurse coordinator with any concerns.      Total time: 60 minutes        Turner Thomas MD  ---------------------------------------------------------------------------------------------------    HPI: Anita Claudio is a 34 year old year old male who presents for a kidney donor evaluation.  Patient would like to donate to his friend.      Anita Claudio is a very pleasant 33 yo M. History notable for lap appendectomy. Works a very physical job at [employer] and is interested in donation to his friend.   Personal history of:   No    Yes  Cancer:    []      []             Comment:      Diabetes   []      []  Comment:    [place]   []      []  Comment:       Hepatitis   []      []  Comment:    Tuberculosis   []      []  Comment:   Back or neck pain:  []      []  Comment:      Kidney stones   []      []  Comment:                  Kidney infections  []      []  Comment:           Urinary retention  []      []            Comment:   Regular NSAID use:  []      []            Comment:      Constipation:   []      []            Comment:      [place]ar[place]  []      []            Comment:      Other:    []      []            Comment:         No past medical history on file.  Past Surgical History:   Procedure Laterality Date    APPENDECTOMY  2010     No family history on file.  Social History     Socioeconomic History    Marital status: Single     Spouse name: Not on file    Number of children: Not on file    Years of education: Not on file    Highest education level: Not on file   Occupational History    Not on file   Tobacco Use    Smoking status: Never    Smokeless tobacco: Not on file   Substance and Sexual Activity    Alcohol use: Not Currently    Drug use: Never    Sexual activity: Not on file   Other Topics Concern    Not on file   Social History Narrative    Not on file     Social Drivers of Health     Financial Resource Strain: Not on file   Food Insecurity: Not on file   Transportation Needs: Not on file   Physical Activity: Not on file   Stress: Not on file   Social Connections: Not on file   Interpersonal Safety: Not on file   Housing Stability: Not on file       ROS:   CONSTITUTIONAL:  No fevers or chills  EYES: negative for icterus  ENT:  negative for hearing loss, tinnitus and sore throat  RESPIRATORY:  negative for cough, sputum, dyspnea  CARDIOVASCULAR:  negative for chest pain  GASTROINTESTINAL:  negative for nausea, vomiting, diarrhea or constipation  GENITOURINARY:  negative for incontinence, dysuria, bladder emptying problems  HEME:  No easy bruising  INTEGUMENT:  negative for  rash and pruritus  NEURO:  Negative for headache, seizure disorder    Allergies:   No Known Allergies    Medications:  Prescription Medications as of 4/24/2025         Rx Number Disp Refills Start End Last Dispensed Date Next Fill Date Owning Pharmacy    erythromycin (ROMYCIN) ophthalmic ointment  1 Tube 0 5/28/2015 --       Sig: Place 0.5 inches Into the left eye 3 times daily    Class: Local Print    Route: Left Eye    ondansetron (ZOFRAN ODT) 4 MG ODT tab  15 tablet 0 11/18/2019 --       Sig: Take 1 tablet (4 mg) by mouth every 8 hours as needed for nausea or vomiting    Class: Local Print    Notes to Pharmacy: May substitute non-ODT formulation per patient preference/insurance coverage.    Route: Oral          Clinic-Administered Medications as of 4/24/2025         Dose Frequency Start End    iohexol (OMNIPAQUE) 350 MG/ML injectable solution 4 mL (Completed) 4 mL ONCE 4/24/2025 4/24/2025    Route: Intravenous    saline flush for lab use ONLY (Completed) 10 mL ONCE 4/24/2025 4/24/2025    Class: E-Prescribe    Route: Intracatheter    sodium chloride (PF) 0.9% PF flush 5 mL (Completed) 5 mL ONCE 4/24/2025 4/24/2025    Admin Instructions: Post infusion line flush.    Route: Intracatheter            Exam:   Temp:  [97.9  F (36.6  C)] 97.9  F (36.6  C)  Pulse:  [58-63] 63  Resp:  [16] 16  BP: (109-117)/(69-74) 111/73  SpO2:  [99 %] 99 %  Body mass index is 28.39 kg/m .  Temp:  [97.9  F (36.6  C)] 97.9  F (36.6  C)  Pulse:  [58-63] 63  Resp:  [16] 16  BP: (109-117)/(69-74) 111/73  SpO2:  [99 %] 99 %  Appearance: in no apparent distress.   Skin: normal, warm, dry  Head and Neck: Normal, no rashes or jaundice  Respiratory: normal respiratory excursions, no audible wheeze  Cardiovascular: RRR  Abdomen: softm, rounded, nontender, no pannus. No hernia   Extremeties: Edema, none  Neuro: without deficit, cranial nerves intact       Diagnostics:   Recent Results (from the past 2 weeks)   EBV Capsid Antibody IgG    Collection  Time: 04/24/25  8:21 AM   Result Value Ref Range    EBV Capsid Sheri IgG Instrument Value >750.0 (H) <18.0 U/mL    EBV Capsid Antibody IgG Positive (A) No detectable antibody.   CMV Antibody IgG    Collection Time: 04/24/25  8:21 AM   Result Value Ref Range    CMV Sheri IgG Instrument Value <0.20 <0.60 U/mL    CMV Antibody IgG No detectable antibody. No detectable antibody.    Treponema Abs w Reflex to RPR and Titer    Collection Time: 04/24/25  8:21 AM   Result Value Ref Range    Treponema Antibody Total Nonreactive Nonreactive   Hepatitis C antibody    Collection Time: 04/24/25  8:21 AM   Result Value Ref Range    Hepatitis C Antibody Nonreactive Nonreactive   Hepatitis B surface antigen    Collection Time: 04/24/25  8:21 AM   Result Value Ref Range    Hepatitis B Surface Antigen Nonreactive Nonreactive   Hepatitis B Surface Antibody    Collection Time: 04/24/25  8:21 AM   Result Value Ref Range    Hepatitis B Surface Antibody Reactive     Hepatitis B Surface Antibody Instrument Value 141.00 <8.5 m[IU]/mL   Hepatitis B core antibody    Collection Time: 04/24/25  8:21 AM   Result Value Ref Range    Hepatitis B Core Antibody Total Nonreactive Nonreactive   CBC with platelets    Collection Time: 04/24/25  8:21 AM   Result Value Ref Range    WBC Count 6.3 4.0 - 11.0 10e3/uL    RBC Count 5.38 4.40 - 5.90 10e6/uL    Hemoglobin 15.3 13.3 - 17.7 g/dL    Hematocrit 45.3 40.0 - 53.0 %    MCV 84 78 - 100 fL    MCH 28.4 26.5 - 33.0 pg    MCHC 33.8 31.5 - 36.5 g/dL    RDW 12.7 10.0 - 15.0 %    Platelet Count 196 150 - 450 10e3/uL   Partial thromboplastin time    Collection Time: 04/24/25  8:21 AM   Result Value Ref Range    aPTT 34 22 - 38 Seconds   INR    Collection Time: 04/24/25  8:21 AM   Result Value Ref Range    INR 1.00 0.85 - 1.15   Cystatin C with GFR    Collection Time: 04/24/25  8:21 AM   Result Value Ref Range    Cystatin C 1.0 0.6 - 1.0 mg/L    GFR Calculated with Cystatin C 87 >=60 mL/min/1.73m2   Hemoglobin A1c     Collection Time: 04/24/25  8:21 AM   Result Value Ref Range    Estimated Average Glucose 108 <117 mg/dL    Hemoglobin A1C 5.4 <5.7 %   Phosphorus    Collection Time: 04/24/25  8:21 AM   Result Value Ref Range    Phosphorus 3.0 2.5 - 4.5 mg/dL   Uric acid    Collection Time: 04/24/25  8:21 AM   Result Value Ref Range    Uric Acid 5.5 3.4 - 7.0 mg/dL   Lipid Profile    Collection Time: 04/24/25  8:21 AM   Result Value Ref Range    Cholesterol 166 <200 mg/dL    Triglycerides 134 <150 mg/dL    Direct Measure HDL 48 >=40 mg/dL    LDL Cholesterol Calculated 91 <100 mg/dL    Non HDL Cholesterol 118 <130 mg/dL    Patient Fasting > 8hrs? Yes    Comprehensive metabolic panel    Collection Time: 04/24/25  8:21 AM   Result Value Ref Range    Sodium 139 135 - 145 mmol/L    Potassium 3.9 3.4 - 5.3 mmol/L    Carbon Dioxide (CO2) 27 22 - 29 mmol/L    Anion Gap 7 7 - 15 mmol/L    Urea Nitrogen 17.0 6.0 - 20.0 mg/dL    Creatinine 1.08 0.67 - 1.17 mg/dL    GFR Estimate >90 >60 mL/min/1.73m2    Calcium 9.6 8.8 - 10.4 mg/dL    Chloride 105 98 - 107 mmol/L    Glucose 93 70 - 99 mg/dL    Alkaline Phosphatase 75 40 - 150 U/L    AST 23 0 - 45 U/L    ALT 18 0 - 70 U/L    Protein Total 7.4 6.4 - 8.3 g/dL    Albumin 4.4 3.5 - 5.2 g/dL    Bilirubin Total 0.8 <=1.2 mg/dL    Patient Fasting > 8hrs? Yes    Adult Type and Screen    Collection Time: 04/24/25  8:21 AM   Result Value Ref Range    ABO/RH(D) B POS     Antibody Screen Negative Negative    SPECIMEN EXPIRATION DATE [id number]    ABO and Rh 2nd type and screen required    Collection Time: 04/24/25  8:42 AM   Result Value Ref Range    ABO/RH(D) B POS     SPECIMEN EXPIRATION DATE [id number]    Protein  random urine    Collection Time: 04/24/25 11:17 AM   Result Value Ref Range    Total Protein Urine mg/dL 8.8   mg/dL    Total Protein Urine mg/mg Creat 0.07 0.00 - 0.20 mg/mg Cr    Creatinine Urine mg/dL 127.0 mg/dL   Albumin Random Urine Quantitative with Creat Ratio    Collection  Time: 04/24/25 11:17 AM   Result Value Ref Range    Creatinine Urine mg/dL 123.0 mg/dL    Albumin Urine mg/L 14.5 mg/L    Albumin Urine mg/g Cr 11.79 0.00 - 17.00 mg/g Cr   Routine UA with microscopic    Collection Time: 04/24/25 11:17 AM   Result Value Ref Range    Color Urine Light Yellow Colorless, Straw, Light Yellow, Yellow    Appearance Urine Clear Clear    Glucose Urine Negative Negative mg/dL    Bilirubin Urine Negative Negative    Ketones Urine Negative Negative mg/dL    Specific Gravity Urine 1.024 1.003 - 1.035    Blood Urine Negative Negative    pH Urine 5.5 5.0 - 7.0    Protein Albumin Urine Negative Negative mg/dL    Urobilinogen Urine Normal Normal mg/dL    Nitrite Urine Negative Negative    Leukocyte Esterase Urine Negative Negative    Mucus Urine Present (A) None Seen /LPF    RBC Urine <1 <=2 /HPF    WBC Urine 3 <=5 /HPF    Hyaline Casts Urine 1 <=2 /LPF

## 2025-04-24 NOTE — LETTER
4/24/2025      Anita Claudio  [address]  [place] [address]      Dear Colleague,    Thank you for referring your patient, Anita Claudio, to the [facility]. Please see a copy of my visit note below.    Saw Anita in clinic on 4/24/2025 for Living Kidney Donor Evaluation.     Anita is interested in donation for his friend Gildardo.    I provided a folder which included copies of the following:    Living Kidney Donor Evaluation Consent  Paired Exchange Consent  Donor Shield Pamphlet  Living Donor Collective Study information  Kidney for Life pamphlet  Kidney Donors are Heroes! Study synopsis  Most current SRTR data.      I also provided a parking pass and food voucher.    I referenced the Living Kidney Donor Evaluation Consent and Paired Exchange/ NDD consent.  I answered any questions.    Evaluation Notes:  Tissue typing collected and sent  Did not know blood type prior to today, ABO B, recipient ABO O. Open to paired exchange/voucher donation.       Again, thank you for allowing me to participate in the care of your patient.        Sincerely,        Turner Thomas MD    Electronically signed

## 2025-04-26 LAB
GAMMA INTERFERON BACKGROUND BLD IA-ACNC: 0.07 IU/ML
HIV 1+2 AB+HIV1 P24 AG SERPL QL IA: NONREACTIVE
M TB IFN-G BLD-IMP: NEGATIVE
M TB IFN-G CD4+ BCKGRND COR BLD-ACNC: 9.93 IU/ML
MITOGEN IGNF BCKGRD COR BLD-ACNC: 0 IU/ML
MITOGEN IGNF BCKGRD COR BLD-ACNC: 0.01 IU/ML
QUANTIFERON MITOGEN: 10 IU/ML
QUANTIFERON NIL TUBE: 0.07 IU/ML
QUANTIFERON TB1 TUBE: 0.07 IU/ML
QUANTIFERON TB2 TUBE: 0.08

## 2025-04-27 LAB
WNV IGG SER IA-ACNC: 0.72 IV
WNV IGM SER IA-ACNC: 0 IV

## 2025-04-28 ENCOUNTER — DOCUMENTATION ONLY (OUTPATIENT)
Dept: TRANSPLANT | Facility: CLINIC | Age: 35
End: 2025-04-28
Payer: MEDICAID

## 2025-04-28 NOTE — PROGRESS NOTES
Emailed to [facility] the orders and instructions for [employer] with billing letter. Faxed to [employer] company the orders with billing letter.

## 2025-04-28 NOTE — PROGRESS NOTES
Psychosocial Evaluation  Living Organ Donation per OPTN Policy 14.1.A  Organ Type: Kidney  Presenting Information:  Anita presents to the [facility] Organ Transplant [facility] to complete a psychosocial evaluation since he is interested in becoming a kidney donor for a close friend of 20 years, Gildardo Johnson.    PERSONAL BACKGROUND:  Current Living Situation: Anita lives in [place] with 2 roommates. He reports his roommates have arrangements to move out in the near future but Anita plans to stay.     Education/Employment/Financial Situation: Anita has an associates degree. He currently works as a [employer]  at [employer]. He has been in this role for the last 3 years. He is knowledgeable of FMLA through his employer, but is going to look into short term disability.      Health Insurance Status: Anita has health insurance benefits through his employer.     Family History: Anita has four siblings, 2 brothers and 2 sisters. His parents live in [place], [place]. He will stay with his parents for at least the first 2 weeks post surgery. He will then return home and reports that friends will take turns staying with him.     General Health: Hx of kidney stone, appendicitis.     Mental Health: Hx of depression. No hx of SI/HI/AVH or IP/OP/IOP. Reports taking anti depressants in middle school and again in high school for 6-8 months each time. Not currently taking medication for mental health. Feels symptoms are stable. Has family/social supports.    Completed at time of evaluation on 4/24/25  ORALIA-7 - 1  PHQ-9 - 1    Alcohol and Drug Use/Abuse/Dependency: Anita reports that he will have one or two mixed drinks or one beer every 3-4 months during a social outing.     Cigarette Use: NA    Legal: NA    Coping with major surgery/associated stress: seek connection through family/social supports, rest    Support System: roommates, friends, siblings,  parents    DONOR SPECIFIC INFORMATION:  Relationship to Recipient: friend     Decision Process/Motivation to Donate: lifelong friend     High risk behaviors as defined by [place] Public Health Services (PHS) that have potential to increase risk of disease transmission were reviewed and [place] demonstrated understanding.     Living Donor Risk Criteria     Person who has had sex (including vaginal, anal, and oral) with a person known or suspected to have HIV, HBV, or HCV infections in the preceding 30 days. No    Men who have had sex with men in the preceding 30 days  No    Person who has had sex in exchange for money or drugs in the preceding 30 days No    Person who has had sex with a person who has sex in exchange for money or drugs in the preceding 30 days No    Person who has injected drugs for non-medical reasons in the preceding 30 days No    Person who has had sex with a person that has injected drugs for non-medical reasons in the preceding 30 days No    Person who has been incarcerated (confined in long term, longterm, or juvenile correctional facility) for greater than/equal to 72 consecutive hours in the preceding 30 days No    Child who has been  within the preceding 30 days by a mother with HIV infection No    Child born in the preceding 30 days to a mother known to be infected with HIV, HBV, or HCV No    Person with an unknown medical or social history for the preceding 30 days (a Donor Risk Assessment Interview - DRAI - cannot be obtained or risk factors cannot be determined) No      PREPARATION FOR DONATION, RECOVERY, AND POTENTIAL SHORT-LONG-TERM OUTCOMES:  Understanding of the Living Donation Process:  We discussed the role of living donor . Short and long term medical and psychosocial risks to both, donor and recipient were reviewed and Anita demonstrated understanding.  Post surgical restrictions (2 weeks no driving, 6 weeks no lifting over 10 lbs) were reviewed and he appears capable of  adhering to the post surgical requirements. The need for a caregiver was discussed and Anita has an appropriate plan in place .  The risk of poor psychosocial outcome including problems with body image, post-surgery depression or anxiety, or feelings of emotional distress or bereavement if recipient experiences any recurrent disease, poor outcome or death was reviewed.  Additionally, potential financial implications, including the risk of having difficulty obtaining health care insurance, life insurance, disability insurance, or long term care insurance were reviewed, as were available donor grants to assist with donor related expenses.      We also discussed some unique issues that arise with paired kidney donation, which include the uncertainty of the timing and the importance of having a employment situation and support system that is able to provide sustained support and flexibility.    Anita appears capable of understanding this information and making an informed medical decision.    Impressions/Recommendations:   Anita  is highly motivated to donate kidney  to his friend, Gildardo.  Anita's decision to donate is free of inducement, coercion, or other undue pressure.  His housing, finances and employment are stable. No current/active mental health or chemical abuse issues were identified. The need for a caregiver was reviewed and he is able to identify a plan to meet his post operative care needs. Anita appears capable of making an informed medical decision. No psychosocial contraindications to living organ donation were identified and  I support Anita s desire to donate a kidney to his friend Gildardo.         Contact Information:   JANIS Sanchez, [facility]  Transplant   Ph: [phone]    Time Spent: 30 minutes      JANIS Ruiz, [facility]  Clinical   Outpatient Kidney/Pancreas/Auto Islet Transplant Program  Ph: [phone]

## 2025-04-29 LAB
BSA: 2.26 M2
COEFF DETERMINATION: 1 %
IOHEXOL CL UR+SERPL-VRATE: 119 ML/MIN
IOHEXOL CL UR+SERPL-VRATE: 2.94 MG/DL
IOHEXOL CL UR+SERPL-VRATE: 4.28 MG/DL
IOHEXOL CL UR+SERPL-VRATE: 5.92 MG/DL
IOHEXOL CL UR+SERPL-VRATE: 91 /1.73 M2

## 2025-04-30 ENCOUNTER — COMMITTEE REVIEW (OUTPATIENT)
Dept: TRANSPLANT | Facility: CLINIC | Age: 35
End: 2025-04-30
Payer: MEDICAID

## 2025-04-30 ENCOUNTER — TELEPHONE (OUTPATIENT)
Dept: TRANSPLANT | Facility: CLINIC | Age: 35
End: 2025-04-30
Payer: MEDICAID

## 2025-04-30 LAB
A*LOCUS SEROLOGIC EQUIVALENT: 1
A*SEROLOGIC EQUIVALENT: 2
ABTEST METHOD: NORMAL
B*LOCUS SEROLOGIC EQUIVALENT: 8
B*SEROLOGIC EQUIVALENT: 62
BW-1: NORMAL
C*LOCUS SEROLOGIC EQUIVALENT: 10
C*SEROLOGIC EQUIVALENT: 7
DQB1*LOCUS SEROLOGIC EQUIVALENT: 2
DQB1*SEROLOGIC EQUIVALENT: 8
DRB1*LOCUS SEROLOGIC EQUIVALENT: 17
DRB1*SEROLOGIC EQUIVALENT: 4
DRB3*LOCUS SEROLOGIC EQUIVALENT: 52
DRB4*SEROLOGIC EQUIVALENT: 53
DRSSO TEST METHOD: NORMAL
HLA-A HIGH RES: NORMAL
HLA-A HIGH RES: NORMAL
HLA-B HIGH RES: NORMAL
HLA-B HIGH RES: NORMAL
HLA-CW HIGH RES: NORMAL
HLA-CW HIGH RES: NORMAL
HLA-DPA1 HIGH RES: NORMAL
HLA-DPB1 HIGH RES: NORMAL
HLA-DQA1 HIGH RES: NORMAL
HLA-DQA1 HIGH RES: NORMAL
HLA-DQB1 HIGH RES: NORMAL
HLA-DQB1 HIGH RES: NORMAL
HLA-DRB1 HIGH RES: NORMAL
HLA-DRB1 HIGH RES: NORMAL
HLA-DRB3 HIGH RES: NORMAL
HLA-DRB4 HIGH RES: NORMAL

## 2025-04-30 NOTE — COMMITTEE REVIEW
Living Donor Committee Review Note Evaluation Date: 4/24/2025  Committee Review Date: 4/30/2025    Donor being evaluated for: Kidney    Transplant Phase: Evaluation  Transplant Status: Active    Transplant Coordinator: Roseann Reza  Transplant Surgeon:        Committee Review Members:  Independent Living Donor Advocate Cuca Quick, [facility], Jess Fishman   Nephrology Kimberly Elliott MD, Radha Zimmerman MD   Nutrition Lennie Wilson, RD   Pharmacist Kyle Taylor, [facility]    - Clinical Shayne Mayen, [facility]   Transplant Poly Blanca Simpson, RN, Lizzy Raphael, [facility], Jessie Hines APRN CNP, Niru Hamm, RN, Billie Christopher, LPN, Fela Doyle, RN, Tami Jameson, SONNY, Letitia Lawson, RN, Roseann Lofton, RN   Transplant Surgery Liudmila Martinez MD, MD       Transplant Eligibility: Acceptable Physical Health, Acceptable Mental Health    Committee Review Decision: Needs Re-presentation    Relative Contraindications: None    Absolute Contraindications: None    Committee Chair Liudmila Martinez MD verbally attested to the committee's decision.    Committee Discussion Details:   Anita needs to complete EKG, CXRAY and CTA renal  Needs to complete litholink  Speak to Anita about his return to work, sooner than 6 weeks from surgery. Anita needs to speak to his employer if there are light duty opportunities to reduce his hernia risk  
Statement Selected

## 2025-04-30 NOTE — TELEPHONE ENCOUNTER
Called Anita with Roseann this morning with no answer. Anita then returned the call to Roseann to review committee review notes:      Anita needs to complete EKG, CXRAY and CTA renal from eval,- he will call scheduling    Needs to complete [employer]- he will call to get supplies to complete this test    Speak to Anita about his return to work, sooner than 6 weeks from surgery. Anita needs to speak to his employer if there are light duty opportunities to reduce his hernia risk- Anita spoke to his direct boss and they will hold his position for 6 weeks and his company does have FMLA for up to 12 weeks. He feels less nervous about proceeding with evaluation

## 2025-05-13 ENCOUNTER — ANCILLARY PROCEDURE (OUTPATIENT)
Dept: CT IMAGING | Facility: CLINIC | Age: 35
End: 2025-05-13
Attending: INTERNAL MEDICINE

## 2025-05-13 ENCOUNTER — ANCILLARY PROCEDURE (OUTPATIENT)
Dept: GENERAL RADIOLOGY | Facility: CLINIC | Age: 35
End: 2025-05-13
Attending: INTERNAL MEDICINE

## 2025-05-13 DIAGNOSIS — Z00.5 TRANSPLANT DONOR EVALUATION: ICD-10-CM

## 2025-05-13 LAB
ATRIAL RATE - MUSE: 52 BPM
DIASTOLIC BLOOD PRESSURE - MUSE: NORMAL MMHG
INTERPRETATION ECG - MUSE: NORMAL
P AXIS - MUSE: 57 DEGREES
PR INTERVAL - MUSE: 180 MS
QRS DURATION - MUSE: 98 MS
QT - MUSE: 430 MS
QTC - MUSE: 399 MS
R AXIS - MUSE: 12 DEGREES
SYSTOLIC BLOOD PRESSURE - MUSE: NORMAL MMHG
T AXIS - MUSE: 32 DEGREES
VENTRICULAR RATE- MUSE: 52 BPM

## 2025-05-13 PROCEDURE — 74175 CTA ABDOMEN W/CONTRAST: CPT | Performed by: STUDENT IN AN ORGANIZED HEALTH CARE EDUCATION/TRAINING PROGRAM

## 2025-05-13 PROCEDURE — 71046 X-RAY EXAM CHEST 2 VIEWS: CPT | Mod: GC | Performed by: RADIOLOGY

## 2025-05-13 RX ORDER — IOPAMIDOL 755 MG/ML
90 INJECTION, SOLUTION INTRAVASCULAR ONCE
Status: COMPLETED | OUTPATIENT
Start: 2025-05-13 | End: 2025-05-13

## 2025-05-13 RX ADMIN — IOPAMIDOL 90 ML: 755 INJECTION, SOLUTION INTRAVASCULAR at 08:15

## 2025-05-13 NOTE — DISCHARGE INSTRUCTIONS

## 2025-05-22 ENCOUNTER — TELEPHONE (OUTPATIENT)
Dept: TRANSPLANT | Facility: CLINIC | Age: 35
End: 2025-05-22

## 2025-05-22 NOTE — TELEPHONE ENCOUNTER
Called Anita to check in after imaging completed and on status of [employer] testing. Anita states he has the supplies, he is just waiting for a good day to hydrate and complete the collection. Plan to check back in 2 weeks.